# Patient Record
Sex: FEMALE | Race: WHITE | NOT HISPANIC OR LATINO | URBAN - METROPOLITAN AREA
[De-identification: names, ages, dates, MRNs, and addresses within clinical notes are randomized per-mention and may not be internally consistent; named-entity substitution may affect disease eponyms.]

---

## 2018-11-13 ENCOUNTER — OFFICE VISIT CONVERTED (OUTPATIENT)
Dept: SURGERY | Facility: CLINIC | Age: 63
End: 2018-11-13
Attending: NURSE PRACTITIONER

## 2019-01-17 ENCOUNTER — HOSPITAL ENCOUNTER (OUTPATIENT)
Dept: MAMMOGRAPHY | Facility: HOSPITAL | Age: 64
Discharge: HOME | End: 2019-01-17
Attending: NURSE PRACTITIONER

## 2019-02-15 ENCOUNTER — HOSPITAL ENCOUNTER (OUTPATIENT)
Dept: SURGERY | Facility: HOSPITAL | Age: 64
Setting detail: HOSPITAL OUTPATIENT SURGERY
Discharge: HOME | End: 2019-02-15
Attending: SURGERY

## 2020-10-13 ENCOUNTER — HOSPITAL ENCOUNTER (OUTPATIENT)
Dept: MAMMOGRAPHY | Facility: HOSPITAL | Age: 65
Discharge: HOME | End: 2020-10-13
Attending: NURSE PRACTITIONER

## 2021-03-08 ENCOUNTER — HOSPITAL ENCOUNTER (OUTPATIENT)
Dept: VACCINE CLINIC | Facility: HOSPITAL | Age: 66
Discharge: HOME | End: 2021-03-08
Attending: INTERNAL MEDICINE

## 2021-03-29 ENCOUNTER — HOSPITAL ENCOUNTER (OUTPATIENT)
Dept: VACCINE CLINIC | Facility: HOSPITAL | Age: 66
Discharge: HOME | End: 2021-03-29
Attending: INTERNAL MEDICINE

## 2021-05-16 VITALS — HEIGHT: 63 IN | WEIGHT: 137.12 LBS | RESPIRATION RATE: 16 BRPM | BODY MASS INDEX: 24.3 KG/M2

## 2021-09-21 ENCOUNTER — TRANSCRIBE ORDERS (OUTPATIENT)
Dept: ADMINISTRATIVE | Facility: HOSPITAL | Age: 66
End: 2021-09-21

## 2021-09-21 DIAGNOSIS — Z12.39 OTHER SCREENING BREAST EXAMINATION: Primary | ICD-10-CM

## 2021-11-30 ENCOUNTER — HOSPITAL ENCOUNTER (OUTPATIENT)
Dept: MAMMOGRAPHY | Facility: HOSPITAL | Age: 66
Discharge: HOME | End: 2021-11-30
Admitting: NURSE PRACTITIONER

## 2021-11-30 DIAGNOSIS — Z12.39 OTHER SCREENING BREAST EXAMINATION: ICD-10-CM

## 2021-11-30 PROCEDURE — 77067 SCR MAMMO BI INCL CAD: CPT

## 2021-11-30 PROCEDURE — 77063 BREAST TOMOSYNTHESIS BI: CPT

## 2022-05-10 PROCEDURE — U0004 COV-19 TEST NON-CDC HGH THRU: HCPCS | Performed by: FAMILY MEDICINE

## 2022-09-15 ENCOUNTER — TRANSCRIBE ORDERS (OUTPATIENT)
Dept: ADMINISTRATIVE | Facility: HOSPITAL | Age: 67
End: 2022-09-15

## 2022-09-15 DIAGNOSIS — Z78.0 MENOPAUSE: Primary | ICD-10-CM

## 2022-11-04 ENCOUNTER — OFFICE VISIT (OUTPATIENT)
Dept: ORTHOPEDIC SURGERY | Facility: CLINIC | Age: 67
End: 2022-11-04

## 2022-11-04 VITALS — WEIGHT: 145 LBS | HEIGHT: 63 IN | BODY MASS INDEX: 25.69 KG/M2

## 2022-11-04 DIAGNOSIS — M25.511 RIGHT SHOULDER PAIN, UNSPECIFIED CHRONICITY: ICD-10-CM

## 2022-11-04 DIAGNOSIS — M25.512 LEFT SHOULDER PAIN, UNSPECIFIED CHRONICITY: ICD-10-CM

## 2022-11-04 PROCEDURE — 99203 OFFICE O/P NEW LOW 30 MIN: CPT | Performed by: ORTHOPAEDIC SURGERY

## 2022-11-04 RX ORDER — DICLOFENAC SODIUM 75 MG/1
75 TABLET, DELAYED RELEASE ORAL 2 TIMES DAILY
Qty: 60 TABLET | Refills: 1 | Status: SHIPPED | OUTPATIENT
Start: 2022-11-04 | End: 2023-01-03

## 2022-11-04 NOTE — PROGRESS NOTES
"Chief Complaint  Pain of the Right Shoulder and Pain of the Left Shoulder     Subjective      Melony Martinez presents to [facility] for pain of the bilateral shoulders. Left has hurt for over 10 years. Patient right one more painful. She has pain with overhead activity and dressing. Patient takes ibuprofen for pain.  No injury noted,     No Known Allergies     Social History     Socioeconomic History   • Marital status:    Tobacco Use   • Smoking status: Never   • Smokeless tobacco: Never   Substance and Sexual Activity   • Alcohol use: Yes        Review of Systems     Objective   Vital Signs:   Ht 160 cm (63\")   Wt 65.8 kg (145 lb)   BMI 25.69 kg/m²       Physical Exam  Constitutional:       Appearance: Normal appearance. Patient is well-developed and normal weight.   HENT:      Head: Normocephalic.      Right Ear: Hearing and external ear normal.      Left Ear: Hearing and external ear normal.      Nose: Nose normal.   Eyes:      Conjunctiva/sclera: Conjunctivae normal.   Cardiovascular:      Rate and Rhythm: Normal rate.   Pulmonary:      Effort: Pulmonary effort is normal.      Breath sounds: No wheezing or rales.   Abdominal:      Palpations: Abdomen is soft.      Tenderness: There is no abdominal tenderness.   Musculoskeletal:      Cervical back: Normal range of motion.   Skin:     Findings: No rash.   Neurological:      Mental Status: Patient is alert and oriented to person, place, and time.   Psychiatric:         Mood and Affect: Mood and affect normal.         Judgment: Judgment normal.       Ortho Exam      BILATERAL SHOULDERS Good tone of deltoid, bicep, triceps, wrist extensors and flexors. Positive impingement test.  ROM with elevation full and 90 degrees for abduction with pain. Left shoulder with good ROM.        Procedures      Imaging Results (Most Recent)     Procedure Component Value Units Date/Time    XR Scapula Right [[id number]] Resulted: 11/04/22 0839    "  Updated: 11/04/22 0848    XR Scapula Left [[id number]] Resulted: 11/04/22 0839     Updated: 11/04/22 0848           Result Review :      X-Ray Report:  Bilateral scapula(s) X-Ray  Indication: Evaluation of bilateral shoulders.   AP/Lateral view(s)  Findings: No acute osseous abnormality, no dislocation or fracture. Moderate arthritis.   Prior studies available for comparison:No         Assessment and Plan     Diagnoses and all orders for this visit:    1. Right shoulder pain, unspecified chronicity  -     XR Scapula Right  -     diclofenac (VOLTAREN) 75 MG EC tablet; Take 1 tablet by mouth 2 (Two) Times a Day.  Dispense: 60 tablet; Refill: 1  -     Ambulatory Referral to Physical Therapy Evaluate and treat    2. Left shoulder pain, unspecified chronicity  -     XR Scapula Left  -     diclofenac (VOLTAREN) 75 MG EC tablet; Take 1 tablet by mouth 2 (Two) Times a Day.  Dispense: 60 tablet; Refill: 1  -     Ambulatory Referral to Physical Therapy Evaluate and treat        Discussed conservative measures as exercises, anti-inflammatory and injection. Patient prescribed anti-inflammatory and HEP.  Patient will be prescribed to physical therapy  If these interventions are not successful.     Call or return if worsening symptoms.    Follow Up     PRN      Patient was given instructions and counseling regarding her condition or for health maintenance advice. Please see specific information pulled into the AVS if appropriate.     Scribed for Starla Torres MD by Diamond Virgen MA.  11/04/22   08:48 EDT    I have personally performed the services described in this document as scribed by the above individual and it is both accurate and complete. Starla Torres MD 11/05/22

## 2022-12-07 ENCOUNTER — APPOINTMENT (OUTPATIENT)
Dept: BONE DENSITY | Facility: HOSPITAL | Age: 67
End: 2022-12-07

## 2022-12-07 ENCOUNTER — APPOINTMENT (OUTPATIENT)
Dept: MAMMOGRAPHY | Facility: HOSPITAL | Age: 67
End: 2022-12-07

## 2023-01-02 DIAGNOSIS — M25.512 LEFT SHOULDER PAIN, UNSPECIFIED CHRONICITY: ICD-10-CM

## 2023-01-02 DIAGNOSIS — M25.511 RIGHT SHOULDER PAIN, UNSPECIFIED CHRONICITY: ICD-10-CM

## 2023-01-03 RX ORDER — DICLOFENAC SODIUM 75 MG/1
TABLET, DELAYED RELEASE ORAL
Qty: 60 TABLET | Refills: 1 | Status: SHIPPED | OUTPATIENT
Start: 2023-01-03

## 2023-02-27 ENCOUNTER — HOSPITAL ENCOUNTER (OUTPATIENT)
Dept: MAMMOGRAPHY | Facility: HOSPITAL | Age: 68
Discharge: HOME | End: 2023-02-27
Payer: MEDICARE

## 2023-02-27 ENCOUNTER — HOSPITAL ENCOUNTER (OUTPATIENT)
Dept: BONE DENSITY | Facility: HOSPITAL | Age: 68
Discharge: HOME | End: 2023-02-27
Payer: MEDICARE

## 2023-02-27 DIAGNOSIS — Z78.0 MENOPAUSE: ICD-10-CM

## 2023-02-27 PROCEDURE — 77067 SCR MAMMO BI INCL CAD: CPT

## 2023-02-27 PROCEDURE — 77080 DXA BONE DENSITY AXIAL: CPT

## 2023-02-27 PROCEDURE — 77063 BREAST TOMOSYNTHESIS BI: CPT

## 2023-09-21 ENCOUNTER — TRANSCRIBE ORDERS (OUTPATIENT)
Dept: ADMINISTRATIVE | Facility: HOSPITAL | Age: 68
End: 2023-09-21

## 2023-09-21 DIAGNOSIS — R10.11 ABDOMINAL PAIN, RIGHT UPPER QUADRANT: Primary | ICD-10-CM

## 2023-09-27 ENCOUNTER — HOSPITAL ENCOUNTER (OUTPATIENT)
Dept: ULTRASOUND IMAGING | Facility: HOSPITAL | Age: 68
Discharge: HOME | End: 2023-09-27
Admitting: NURSE PRACTITIONER
Payer: MEDICARE

## 2023-09-27 DIAGNOSIS — R10.11 ABDOMINAL PAIN, RIGHT UPPER QUADRANT: ICD-10-CM

## 2023-09-27 PROCEDURE — 76700 US EXAM ABDOM COMPLETE: CPT

## 2023-10-23 ENCOUNTER — TRANSCRIBE ORDERS (OUTPATIENT)
Dept: ADMINISTRATIVE | Facility: HOSPITAL | Age: 68
End: 2023-10-23
Payer: MEDICARE

## 2023-10-23 DIAGNOSIS — Z12.31 SCREENING MAMMOGRAM FOR HIGH-RISK PATIENT: Primary | ICD-10-CM

## 2023-12-14 ENCOUNTER — TRANSCRIBE ORDERS (OUTPATIENT)
Dept: ADMINISTRATIVE | Facility: HOSPITAL | Age: 68
End: 2023-12-14
Payer: MEDICARE

## 2023-12-14 DIAGNOSIS — Z13.820 SCREENING FOR OSTEOPOROSIS: Primary | ICD-10-CM

## 2024-03-16 ENCOUNTER — HOSPITAL ENCOUNTER (OUTPATIENT)
Dept: MAMMOGRAPHY | Facility: HOSPITAL | Age: 69
Discharge: HOME | End: 2024-03-16
Admitting: NURSE PRACTITIONER
Payer: MEDICARE

## 2024-03-16 DIAGNOSIS — Z12.31 SCREENING MAMMOGRAM FOR HIGH-RISK PATIENT: ICD-10-CM

## 2024-03-16 PROCEDURE — 77063 BREAST TOMOSYNTHESIS BI: CPT

## 2024-03-16 PROCEDURE — 77067 SCR MAMMO BI INCL CAD: CPT

## 2025-02-13 ENCOUNTER — TRANSCRIBE ORDERS (OUTPATIENT)
Dept: ADMINISTRATIVE | Facility: HOSPITAL | Age: 70
End: 2025-02-13
Payer: MEDICARE

## 2025-02-13 DIAGNOSIS — Z12.31 VISIT FOR SCREENING MAMMOGRAM: Primary | ICD-10-CM

## 2025-04-10 ENCOUNTER — HOSPITAL ENCOUNTER (OUTPATIENT)
Dept: MAMMOGRAPHY | Facility: HOSPITAL | Age: 70
Discharge: HOME | End: 2025-04-10
Admitting: NURSE PRACTITIONER
Payer: MEDICARE

## 2025-04-10 DIAGNOSIS — Z12.31 VISIT FOR SCREENING MAMMOGRAM: ICD-10-CM

## 2025-04-10 PROCEDURE — 77067 SCR MAMMO BI INCL CAD: CPT

## 2025-04-10 PROCEDURE — 77063 BREAST TOMOSYNTHESIS BI: CPT

## 2025-04-16 ENCOUNTER — TRANSCRIBE ORDERS (OUTPATIENT)
Dept: ADMINISTRATIVE | Facility: HOSPITAL | Age: 70
End: 2025-04-16
Payer: MEDICARE

## 2025-04-16 DIAGNOSIS — Z78.0 MENOPAUSE: Primary | ICD-10-CM

## 2025-05-23 ENCOUNTER — HOSPITAL ENCOUNTER (OUTPATIENT)
Dept: BONE DENSITY | Facility: HOSPITAL | Age: 70
Discharge: HOME | End: 2025-05-23
Payer: MEDICARE

## 2025-05-23 DIAGNOSIS — Z78.0 MENOPAUSE: ICD-10-CM

## 2025-05-23 PROCEDURE — 77080 DXA BONE DENSITY AXIAL: CPT
